# Patient Record
(demographics unavailable — no encounter records)

---

## 2025-04-25 NOTE — PHYSICAL EXAM
[de-identified] : Exam today reveals supple motion to the cervical spine as well as the right shoulder without spasm or tenderness the right elbow has slight restriction of full extension full flexion and rotation is noted there is no instability of stress of the collateral she does have pain over the medial epicondyle and flexor pronator group.  There is no crepitus on motion of distal forearm neuro vas exam is unremarkable.  X-rays ordered and taken today 3 views of the right elbow reviewed interpreted by myself reveal no significant abnormalities

## 2025-04-25 NOTE — ASSESSMENT
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  She will be treated with stretching exercises at home along with Mobic with GI precautions.  Return if she is not improving the potential for steroid injection and formal physical therapy has been discussed

## 2025-04-25 NOTE — HISTORY OF PRESENT ILLNESS
[de-identified] : This 70-year-old right-handed pleasant lady is seen for evaluation of her right elbow.  She has had 2-3 months of pain more so along the inner aspect.  She has been active though no obvious history of trauma precipitating event.  She has noted mild swelling to the elbow with stiffness.  She is still functional with no clicking popping or sensation of instability.  Pain does not radiate distally no numbness or paresthesias no neck pain.  Her general health has been good since last seen.

## 2025-07-01 NOTE — HISTORY OF PRESENT ILLNESS
[de-identified] : This 70-year-old is seen today for evaluation of her right shoulder she was well until June 22 when she slipped on a wet service injuring her right shoulder and left knee as well as striking her head.  She was seen at a medical facility where x-rays of the right shoulder and left knee were taken and said to be negative for fracture degenerative changes noted only.  CT scan of the head proved to be negative.  On today's visit does not have any issues with regards to headache or visual disturbance or neck pain and her knee has quieted down.  She has noted some improvement though still has restricted motion to the right shoulder no clicking popping or sensation of instability

## 2025-07-01 NOTE — ASSESSMENT
[FreeTextEntry1] : Impression: Strain right rotator cuff.  I have injected the subacromial space uneventfully.  She will continue with Mobic which she has at home.  Physical therapy has been ordered she will return if she is not improving

## 2025-07-01 NOTE — PHYSICAL EXAM
[de-identified] : Exam today normal gait no limp.  She has good motion to the neck in all planes the right shoulder has no obvious swelling or bruising she does have restricted forward flexion abduction in scapular plane as well as restricted rotation external more so than internal.  Painful in the subacromial space strength is restricted by pain.  Neurovascular status is intact.  I have reviewed written results of x-rays of the right shoulder and left knee and a CT scan of the head.